# Patient Record
Sex: FEMALE | Race: WHITE | NOT HISPANIC OR LATINO | URBAN - NONMETROPOLITAN AREA
[De-identification: names, ages, dates, MRNs, and addresses within clinical notes are randomized per-mention and may not be internally consistent; named-entity substitution may affect disease eponyms.]

---

## 2018-07-08 ENCOUNTER — HOSPITAL ENCOUNTER (EMERGENCY)
Facility: HOSPITAL | Age: 78
Discharge: HOME | End: 2018-07-08
Attending: FAMILY MEDICINE | Admitting: FAMILY MEDICINE

## 2018-07-08 ENCOUNTER — APPOINTMENT (OUTPATIENT)
Dept: CT IMAGING | Facility: HOSPITAL | Age: 78
End: 2018-07-08

## 2018-07-08 VITALS
BODY MASS INDEX: 32.3 KG/M2 | WEIGHT: 201 LBS | HEART RATE: 62 BPM | TEMPERATURE: 98.2 F | DIASTOLIC BLOOD PRESSURE: 67 MMHG | RESPIRATION RATE: 15 BRPM | SYSTOLIC BLOOD PRESSURE: 146 MMHG | OXYGEN SATURATION: 96 % | HEIGHT: 66 IN
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
## 2018-07-08 DIAGNOSIS — S09.90XA INJURY OF HEAD, INITIAL ENCOUNTER: ICD-10-CM

## 2018-07-08 PROCEDURE — 72125 CT NECK SPINE W/O DYE: CPT

## 2018-07-08 PROCEDURE — 99283 EMERGENCY DEPT VISIT LOW MDM: CPT

## 2018-07-08 PROCEDURE — 70450 CT HEAD/BRAIN W/O DYE: CPT

## 2018-07-08 NOTE — ED NOTES
C Collar applied to patient per Dr. Helms due to patient fall for cervical support.     Андрей Obrien RN  07/08/18 7920

## 2018-07-08 NOTE — ED PROVIDER NOTES
[facility]  eMERGENCY dEPARTMENT eNCOUnter      Pt Name: Cristina Bautista  MRN: [id number]  Birthdate 1940  Date of evaluation: 7/8/2018      CHIEF COMPLAINT       Chief Complaint   Patient presents with   • Fall       Nurses Notes reviewed and I agree except as noted in the HPI.      HISTORY OF PRESENT ILLNESS    Cristina Bautista is a 77 y.o. female who presents   Location/Symptom: Fall down approximately 11 stairs on a riverboat.  Patient is very insistent that she feels fine no loss of consciousness reported.  Patient states that she did have some discomfort on neck the edge of her shoulder.  And she states that her left orbital area has a contusion.  No treatment prior to arrival.  The patient is very concerned that she get out of here quickly because she has to be back on the boat at 5 PM or the [place].  No treatment prior to arrival despite rolling down 11 stairs.  No c-collar applied by EMS nor the patient nor the boat.    REVIEW OF SYSTEMS     Review of Systems  CONSTITUTION: No Fever, No chills, No activity change, No diaphoresis, No unexpected wt change.    HENT: No congestion, no dental problems, no ear pain or discharge, , no nuchal rigidity, no meningeal signs. Positive for contusion to head  EYES: No drainage, no itching, no photophobia, no visual disturbance  RESPIRATORY: No apnea, no chest tightness, no cough, no shortness of breath, no stridor, no wheezing  CARDIOVASCULAR: No chest pain, no palpitations, no leg swelling  GI: No abdominal distention, no abdominal pain, no rectal bleeding, no melena, no hematochezia, no diarrhea, no nausea, no vomiting  ENDOCRINE: No polydipsia, no polyphagia, no polyuria, no cold or heat intolerance  : No difficulty urinating, no dyspareunia, no dysuria, no flank pain, no frequency, no genital sore, no hematuria, no menstrual problem if female, no decreased urination, no hesitation of urination, no vaginal discharge if female, no vaginal pain if  "female no penile pain if male  ALLERGY/IMMUNO:  No environmental or food allergies, not immunocompromised  NEUROLOGICAL: No dizziness, no facial asymmetry, no Headaches, no Light-headedness, no numbness nor paresthesias, no seizures, no speech difficulty, No syncope, no tremors, no weakness  HEMATOLOGIC: No adenopathy, no unusual bleeding or bruising  MUSCULOSKELETAL: No arthralgia, no back pain, no joint swelling, no myalgias, no neck pain, no neck stiffness, Pulses 2/4 in all extremities.  Denies pain at this time does have small abrasion to left shoulder.  SKIN: No rash, no color change, no pallor, no wound  PSYCH: No agitation, no behavior problem, no confusion, no decreased concentration, no hallucinations, no suicidal ideation, no homicidal ideation, no self injury, no sleep disturbance  All other systems negative.    PAST MEDICAL HISTORY     Past Medical History:   Diagnosis Date   • Diabetes mellitus (CMS/[facility])    • Hypertension        SURGICAL HISTORY      has no past surgical history on file.    CURRENT MEDICATIONS        Medication List      You have not been prescribed any medications.       ALLERGIES     has No Known Allergies.    FAMILY HISTORY     has no family status information on file.    family history is not on file.    SOCIAL HISTORY      reports that she has never smoked. She does not have any smokeless tobacco history on file. She reports that she does not drink alcohol or use drugs.    PHYSICAL EXAM     INITIAL VITALS:  height is 167.6 cm (66\") and weight is 91.2 kg (201 lb). Her oral temperature is 98 °F (36.7 °C). Her blood pressure is 159/70 and her pulse is 65. Her respiration is 15 and oxygen saturation is 98%.    Physical Exam    CONSTITUTIONAL: Well developed, well nourished, not diaphoretic nor distressed  HENT: Normocephalic, Positive for contusion left upper periorbital area oropharynx clear and moist  EYES: PERRL, EOM normal, no discharge, no scleral icterus  NECK: ROM normal, " supple, no tracheal deviation nor JVD, no stridor  CARDIOVASCULAR: Normal rate and rhythm, heart sounds normal, no rub no gallop, intact distal pulses, normal cap refill  PULMONARY: Normal effort and breath sounds, no distress, no wheezes, rhonchi or rales, no chest tenderness  ABDOMINAL: Soft, nontender, no guarding, no mass, no rebound, no hernia  GENITOURINARY/ANORECTAL: deferred  MUSCULOSKELETAL: ROM normal, no tenderness nor deformity, no edema  NEUROLOGICAL: Alert, oriented x 3, DTRs normal, CN x 10 normal, normal tone  SKIN: Warm, dry, no erythema, no rash, normal color  PSYCH: Mood and affect normal, behavior normal, thought content and judgement normal.      DIFFERENTIAL DIAGNOSIS:       DIAGNOSTIC RESULTS     EKG: All EKG's are interpreted by the Emergency Department Physician who either signs or Co-signs this chart in the absence of a cardiologist.      RADIOLOGY: non-plain film images(s) such as CT, Ultrasound and MRI are read by the radiologist.  Plain radiographic images are visualized and preliminarily interpreted by the emergency physician unless otherwise stated below.  Ct Head Without Contrast    Result Date: 7/8/2018  Narrative: EXAM: CT OF THE HEAD WITHOUT IV CONTRAST CT CERVICAL SPINE WITHOUT IV CONTRAST 7/8/2018  COMPARISON: None.  INDICATION: Fall  PROCEDURE: Non contrast enhanced head CT and cervical spine CT were performed. The head images were formatted in the axial plane at 5 mm thick intervals. The cervical spine images were formatted in the axial, coronal and sagittal planes.  Radiation dose equals  mGy-cm.  Automated exposure control dose reduction technique was implemented.  FINDINGS: HEAD: Ventricles and cerebrospinal fluid spaces are normal in size and configuration for the patient's age. There is no evidence of mass-effect or midline shift. The gray-white differential is preserved. Mild to moderate chronic microvascular changes..  There is no evidence of intracranial  contusion, hemorrhage, or skull fracture.  The visualized portions of the paranasal sinuses and mastoid air cells are unremarkable.  CERVICAL SPINE: The odontoid process is well approximated with the anterior body of C1 and well aligned between the lateral masses of C1. Grade 1 anterolisthesis of C4 on C5. Grade 1 anterolisthesis of C5 on C6, with lack of soft tissue changes, favoring a chronic finding. Degenerative fusion of posterior elements of C2-C3 and C3-C4 and bilaterally. No evidence of acute compression deformity. No dislocation. There is no paraspinal hematoma. Multilevel degenerative changes, with disc disease and facet arthropathy.      Impression: CT head. No acute intra-abdominal abnormalities.  CT cervical spine. 1. No acute osseous posttraumatic changes. 2. Alignment as described above. This report was finalized on 07/08/2018 15:09 by Dr. Chuyita Ferreira MD.    Ct Cervical Spine Without Contrast    Result Date: 7/8/2018  Narrative: EXAM: CT OF THE HEAD WITHOUT IV CONTRAST CT CERVICAL SPINE WITHOUT IV CONTRAST 7/8/2018  COMPARISON: None.  INDICATION: Fall  PROCEDURE: Non contrast enhanced head CT and cervical spine CT were performed. The head images were formatted in the axial plane at 5 mm thick intervals. The cervical spine images were formatted in the axial, coronal and sagittal planes.  Radiation dose equals  mGy-cm.  Automated exposure control dose reduction technique was implemented.  FINDINGS: HEAD: Ventricles and cerebrospinal fluid spaces are normal in size and configuration for the patient's age. There is no evidence of mass-effect or midline shift. The gray-white differential is preserved. Mild to moderate chronic microvascular changes..  There is no evidence of intracranial contusion, hemorrhage, or skull fracture.  The visualized portions of the paranasal sinuses and mastoid air cells are unremarkable.  CERVICAL SPINE: The odontoid process is well approximated with the anterior  "body of C1 and well aligned between the lateral masses of C1. Grade 1 anterolisthesis of C4 on C5. Grade 1 anterolisthesis of C5 on C6, with lack of soft tissue changes, favoring a chronic finding. Degenerative fusion of posterior elements of C2-C3 and C3-C4 and bilaterally. No evidence of acute compression deformity. No dislocation. There is no paraspinal hematoma. Multilevel degenerative changes, with disc disease and facet arthropathy.      Impression: CT head. No acute intra-abdominal abnormalities.  CT cervical spine. 1. No acute osseous posttraumatic changes. 2. Alignment as described above. This report was finalized on 07/08/2018 15:09 by Dr. Chuyita Ferreira MD.             LABS:   Lab Results (last 24 hours)     ** No results found for the last 24 hours. **          EMERGENCY DEPARTMENT COURSE:   Vitals:    Vitals:    07/08/18 1420 07/08/18 1421   BP: 159/70    Pulse: 65    Resp: 15    Temp: 98 °F (36.7 °C)    TempSrc: Oral    SpO2: 98%    Weight:  91.2 kg (201 lb)   Height:  167.6 cm (66\")       The patient was given the following medications:  Medications - No data to display         CRITICAL CARE:  none    CONSULTS:  none    PROCEDURES:  None    MDM    FINAL IMPRESSION      1. Fall, initial encounter    2. Injury of head, initial encounter          DISPOSITION/PLAN   Discharged back home to boat in stable condition.  Patient has been strongly encouraged to be watched for head injury precautions she is given discharge papers as such.  In addition she is to return if symptoms change or worsen.      PATIENT REFERRED TO:  No Known Provider  [facility] [id number]  [phone]    In 2 days        DISCHARGE MEDICATIONS:     Medication List      You have not been prescribed any medications.       (Please note that portions of this note were completed with a voice recognition program.)    Brielle Bryant, DO Brielle Bryant,   07/08/18 1535    "